# Patient Record
Sex: FEMALE | Race: WHITE | NOT HISPANIC OR LATINO
[De-identification: names, ages, dates, MRNs, and addresses within clinical notes are randomized per-mention and may not be internally consistent; named-entity substitution may affect disease eponyms.]

---

## 2021-03-18 PROBLEM — Z00.00 ENCOUNTER FOR PREVENTIVE HEALTH EXAMINATION: Status: ACTIVE | Noted: 2021-03-18

## 2021-03-22 ENCOUNTER — APPOINTMENT (OUTPATIENT)
Dept: ORTHOPEDIC SURGERY | Facility: CLINIC | Age: 51
End: 2021-03-22
Payer: COMMERCIAL

## 2021-03-22 VITALS — HEIGHT: 67 IN | BODY MASS INDEX: 29.98 KG/M2 | WEIGHT: 191 LBS

## 2021-03-22 DIAGNOSIS — Z78.9 OTHER SPECIFIED HEALTH STATUS: ICD-10-CM

## 2021-03-22 DIAGNOSIS — Z80.52 FAMILY HISTORY OF MALIGNANT NEOPLASM OF BLADDER: ICD-10-CM

## 2021-03-22 DIAGNOSIS — Z87.39 PERSONAL HISTORY OF OTHER DISEASES OF THE MUSCULOSKELETAL SYSTEM AND CONNECTIVE TISSUE: ICD-10-CM

## 2021-03-22 DIAGNOSIS — S93.602A UNSPECIFIED SPRAIN OF LEFT FOOT, INITIAL ENCOUNTER: ICD-10-CM

## 2021-03-22 DIAGNOSIS — I10 ESSENTIAL (PRIMARY) HYPERTENSION: ICD-10-CM

## 2021-03-22 PROCEDURE — 99203 OFFICE O/P NEW LOW 30 MIN: CPT

## 2021-03-22 PROCEDURE — 99072 ADDL SUPL MATRL&STAF TM PHE: CPT

## 2021-03-22 RX ORDER — LOSARTAN POTASSIUM AND HYDROCHLOROTHIAZIDE 12.5; 5 MG/1; MG/1
50-12.5 TABLET ORAL
Qty: 30 | Refills: 0 | Status: ACTIVE | COMMUNITY
Start: 2021-03-01

## 2021-03-22 NOTE — PHYSICAL EXAM
[de-identified] : Constitutional: Well-nourished, well-developed, No acute distress\par Respiratory:  Good respiratory effort, no SOB\par Lymphatic: No regional lymphadenopathy, no lymphedema\par Psychiatric: Pleasant and normal affect, alert and oriented x3\par Skin: Clean dry and intact B/L UE/LE\par Musculoskeletal: normal except where as noted in regional exam\par \par Left foot:\par APPEARANCE: no swelling, + bunion, + claw toes, no malalignment\par POSITIVE TENDERNESS: Moderate tenderness over second MTP joint, plantar > dorsal pain at this location\par NONTENDER: 5th metatarsal base, cuboid, 1st MTP, dorsum & plantar surfaces, medial heel, mid heel. \par ROM: normal throughout foot, ankle, and digits. \par RESISTIVE TESTING: painless flex/ext, abd/add of all digits. \par SPECIAL TESTS:  neg Tinel's at tarsal tunnel. \par NEURO: Normal sensation of LE\par  [de-identified] : The following radiographs were ordered and read by me during this patient's visit. I reviewed each radiograph in detail with the patient and discussed the findings as highlighted below. 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  No acute fracture, or dislocation seen at this time. There is no malalignment, + claw toe deformity noted.  No other obvious osseous abnormality. Otherwise unremarkable.

## 2021-03-22 NOTE — DISCUSSION/SUMMARY
[de-identified] : Discussed findings of today's exam and possible causes of patient's pain.  Educated patient on their most probable diagnosis of left foot second MTP sprain.  Reviewed possible courses of treatment, and we collaboratively decided best course of treatment at this time will include conservative management.  Patient advised there is no evidence of fracture on x-ray today.  Patient appears to have a sprain of the second MTP joint, potentially of the plantar plate, but no instability on clinical exam today.  Patient injured herself 4 weeks ago, she has had notable interval improvement since initial injury, it is much more localized than when it initially happened, and her pain is less frequent.  Patient was seen by podiatry and was fitted for custom orthotics, these are a rigid orthotic so they are providing good support, she is also advised to wear shoes that have a wide toe box, as well as shoes that have good firm arch support as well.  Recommend continued watchful waiting, this issue should resolve over the next 2-4 weeks.  If patient does not have continued improvement may consider physical therapy.  If she continues to have localized pain to the second MTP joint may consider MRI in the future to evaluate for significant ligamentous disruption, however this is unlikely to require any surgical intervention at this time.  Follow up as needed.  Patient appreciates and agrees with current plan.\par \par This note was generated using dragon medical dictation software.  A reasonable effort has been made for proofreading its contents, but typos may still remain.  If there are any questions or points of clarification needed please notify my office.\par

## 2021-03-22 NOTE — HISTORY OF PRESENT ILLNESS
[de-identified] : Patient is here for left foot pain. Pain is worst over dorsal 2nd MTP joint, localized, no radiation. Tripped over a step when running into house while chasing dog, step hit MTP joints dorsally on the left foot. Had pain/swelling next day. Improved today but still having TTP over 2nd MTP. Saw podiatry for this issue, she was given custom orthotics.\par \par The patient's past medical history, past surgical history, medications and allergies were reviewed by me today and documented accordingly. In addition, the patient's family and social history, which were noncontributory to this visit, were reviewed also. Intake form was reviewed. The patient has no family history of arthritis.